# Patient Record
(demographics unavailable — no encounter records)

---

## 2025-01-16 NOTE — REASON FOR VISIT
I spoke with Kesha and let her know Liya's recommendations due to R sided back pain.    Kesha agreeable    She has seen  in the past.    Kesha transferred to schedulers to make appt with Dr. Fung.    Encounter closed  
Kesha is calling today and states she was seen in urgent care on the 31st with complaints of urinary urgency and pain with wiping as well as R sided back pain that hurts more at night.    She states she gave a urine sample and they prescribed Keflex, which she completed on Sunday.  Her urine culture was negative.    She states she felt a little better after the antibiotic, but symptoms have returned.    She feels urgency and pain when she wipes, as well as R sided back pain mostly at night.    Please advise if you would like her to give another urine sample.    LOV 8/23/23 annual  
Pt stated she went to urgent care for a UTI and they gave her antibiotics to take. Her symptoms are still present. She wants to know if Liya can help or would she have to call the urgent care  
Recommend an apt with OB/GYN physician due to the Rt side pain  
[Follow-Up] : a follow-up visit

## 2025-01-16 NOTE — HISTORY OF PRESENT ILLNESS
[TextBox_4] : This visit was provided via telehealth using real-time 2-way audio visual technology. The patient,  PIETRO FISCHER , was located at home, [address] [place] [address] at the time of the visit.  The provider, Monroe Daniel, was located at office at the time of the visit.  The patient, Ms. PIETRO FISCHER and Physician MD Daniel, Monroe participated in the telehealth encounter.  Verbal consent obtained by  from patient   PIETRO FISCHER is a 40 year old female who presents for asthma fu seen in 2023  currently with URI  using nilsa out of [place]